# Patient Record
Sex: FEMALE | Race: WHITE | NOT HISPANIC OR LATINO | Employment: FULL TIME | URBAN - METROPOLITAN AREA
[De-identification: names, ages, dates, MRNs, and addresses within clinical notes are randomized per-mention and may not be internally consistent; named-entity substitution may affect disease eponyms.]

---

## 2022-03-28 LAB
EXTERNAL HEPATITIS B SURFACE ANTIGEN: NEGATIVE
EXTERNAL HEPATITIS C AB: NON REACTIVE
EXTERNAL RUBELLA QUALITATIVE: NORMAL
EXTERNAL SYPHILIS RPR SCREEN: NORMAL
HIV1 P24 AG SERPL QL IA: NORMAL

## 2022-05-11 ENCOUNTER — APPOINTMENT (OUTPATIENT)
Dept: ULTRASOUND IMAGING | Facility: HOSPITAL | Age: 37
End: 2022-05-11

## 2022-05-11 ENCOUNTER — HOSPITAL ENCOUNTER (EMERGENCY)
Facility: HOSPITAL | Age: 37
Discharge: HOME | End: 2022-05-11
Attending: EMERGENCY MEDICINE | Admitting: EMERGENCY MEDICINE

## 2022-05-11 VITALS
RESPIRATION RATE: 18 BRPM | HEART RATE: 76 BPM | BODY MASS INDEX: 25.12 KG/M2 | WEIGHT: 156.31 LBS | OXYGEN SATURATION: 99 % | TEMPERATURE: 98 F | HEIGHT: 66 IN | SYSTOLIC BLOOD PRESSURE: 118 MMHG | DIASTOLIC BLOOD PRESSURE: 75 MMHG

## 2022-05-11 DIAGNOSIS — Z3A.14 14 WEEKS GESTATION OF PREGNANCY: Primary | ICD-10-CM

## 2022-05-11 DIAGNOSIS — R10.11 RIGHT UPPER QUADRANT ABDOMINAL PAIN: ICD-10-CM

## 2022-05-11 DIAGNOSIS — S39.011A STRAIN OF ABDOMINAL WALL, INITIAL ENCOUNTER: ICD-10-CM

## 2022-05-11 LAB
ALBUMIN SERPL-MCNC: 3.7 G/DL (ref 3.5–5.2)
ALBUMIN/GLOB SERPL: 1.2 G/DL
ALP SERPL-CCNC: 54 U/L (ref 39–117)
ALT SERPL W P-5'-P-CCNC: 13 U/L (ref 1–33)
ANION GAP SERPL CALCULATED.3IONS-SCNC: 12 MMOL/L (ref 5–15)
AST SERPL-CCNC: 21 U/L (ref 1–32)
BASOPHILS # BLD AUTO: 0.1 10*3/MM3 (ref 0–0.2)
BASOPHILS NFR BLD AUTO: 0.7 % (ref 0–1.5)
BILIRUB SERPL-MCNC: <0.2 MG/DL (ref 0–1.2)
BUN SERPL-MCNC: 9 MG/DL (ref 6–20)
BUN/CREAT SERPL: 15.5 (ref 7–25)
CALCIUM SPEC-SCNC: 9.6 MG/DL (ref 8.6–10.5)
CHLORIDE SERPL-SCNC: 103 MMOL/L (ref 98–107)
CO2 SERPL-SCNC: 21 MMOL/L (ref 22–29)
CREAT SERPL-MCNC: 0.58 MG/DL (ref 0.57–1)
DEPRECATED RDW RBC AUTO: 40.3 FL (ref 37–54)
EGFRCR SERPLBLD CKD-EPI 2021: 121.2 ML/MIN/1.73
EOSINOPHIL # BLD AUTO: 0.1 10*3/MM3 (ref 0–0.4)
EOSINOPHIL NFR BLD AUTO: 1 % (ref 0.3–6.2)
ERYTHROCYTE [DISTWIDTH] IN BLOOD BY AUTOMATED COUNT: 12.8 % (ref 12.3–15.4)
GLOBULIN UR ELPH-MCNC: 3 GM/DL
GLUCOSE SERPL-MCNC: 95 MG/DL (ref 65–99)
HCT VFR BLD AUTO: 40.8 % (ref 34–46.6)
HGB BLD-MCNC: 13.3 G/DL (ref 12–15.9)
LIPASE SERPL-CCNC: 47 U/L (ref 13–60)
LYMPHOCYTES # BLD AUTO: 1.8 10*3/MM3 (ref 0.7–3.1)
LYMPHOCYTES NFR BLD AUTO: 17.9 % (ref 19.6–45.3)
MCH RBC QN AUTO: 29.3 PG (ref 26.6–33)
MCHC RBC AUTO-ENTMCNC: 32.5 G/DL (ref 31.5–35.7)
MCV RBC AUTO: 90.2 FL (ref 79–97)
MONOCYTES # BLD AUTO: 0.5 10*3/MM3 (ref 0.1–0.9)
MONOCYTES NFR BLD AUTO: 4.7 % (ref 5–12)
NEUTROPHILS NFR BLD AUTO: 7.4 10*3/MM3 (ref 1.7–7)
NEUTROPHILS NFR BLD AUTO: 75.7 % (ref 42.7–76)
NRBC BLD AUTO-RTO: 0 /100 WBC (ref 0–0.2)
PLATELET # BLD AUTO: 280 10*3/MM3 (ref 140–450)
PMV BLD AUTO: 7.8 FL (ref 6–12)
POTASSIUM SERPL-SCNC: 3.9 MMOL/L (ref 3.5–5.2)
PROT SERPL-MCNC: 6.7 G/DL (ref 6–8.5)
RBC # BLD AUTO: 4.53 10*6/MM3 (ref 3.77–5.28)
SODIUM SERPL-SCNC: 136 MMOL/L (ref 136–145)
WBC NRBC COR # BLD: 9.8 10*3/MM3 (ref 3.4–10.8)

## 2022-05-11 PROCEDURE — 99283 EMERGENCY DEPT VISIT LOW MDM: CPT

## 2022-05-11 PROCEDURE — 76801 OB US < 14 WKS SINGLE FETUS: CPT

## 2022-05-11 PROCEDURE — 85025 COMPLETE CBC W/AUTO DIFF WBC: CPT | Performed by: NURSE PRACTITIONER

## 2022-05-11 PROCEDURE — 80053 COMPREHEN METABOLIC PANEL: CPT | Performed by: NURSE PRACTITIONER

## 2022-05-11 PROCEDURE — 93976 VASCULAR STUDY: CPT

## 2022-05-11 PROCEDURE — 83690 ASSAY OF LIPASE: CPT | Performed by: NURSE PRACTITIONER

## 2022-05-11 RX ORDER — SODIUM CHLORIDE 0.9 % (FLUSH) 0.9 %
10 SYRINGE (ML) INJECTION AS NEEDED
Status: DISCONTINUED | OUTPATIENT
Start: 2022-05-11 | End: 2022-05-11 | Stop reason: HOSPADM

## 2022-05-11 NOTE — ED PROVIDER NOTES
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   HENT: Negative for congestion, tinnitus and trouble swallowing.    Eyes: Negative for photophobia, discharge and redness.   Respiratory: Negative for cough and shortness of breath.    Cardiovascular: Negative for chest pain and palpitations.   Gastrointestinal: Positive for abdominal pain. Negative for diarrhea, nausea and vomiting.        14 weeks pregnant   Genitourinary: Negative for dysuria, frequency, urgency, vaginal bleeding and vaginal discharge.   Musculoskeletal: Negative for back pain, joint swelling and myalgias.   Skin: Negative for rash.   Neurological: Negative for dizziness and headaches.   Psychiatric/Behavioral: Negative for confusion.   All other systems reviewed and are negative.      History reviewed. No pertinent past medical history.    Allergies   Allergen Reactions   • Amoxicillin Hives   • Eggs Or Egg-Derived Products GI Intolerance   • Lac Bovis GI Intolerance       History reviewed. No pertinent surgical history.    History reviewed. No pertinent family history.    Social History     Socioeconomic History   • Marital status:    Tobacco Use   • Smoking status: Never Smoker           Objective   Physical Exam  Vitals reviewed.   Constitutional:       General: She is not in acute distress.     Appearance: She is well-developed and normal weight. She is not ill-appearing or  "toxic-appearing.   HENT:      Head: Normocephalic and atraumatic.      Mouth/Throat:      Mouth: Mucous membranes are moist.   Eyes:      Conjunctiva/sclera: Conjunctivae normal.      Pupils: Pupils are equal, round, and reactive to light.   Cardiovascular:      Rate and Rhythm: Normal rate and regular rhythm.      Heart sounds: Normal heart sounds.   Pulmonary:      Effort: Pulmonary effort is normal. No respiratory distress.      Breath sounds: Normal breath sounds. No wheezing.   Abdominal:      General: Abdomen is protuberant. Bowel sounds are normal. There is no distension.      Palpations: Abdomen is soft. There is no mass.      Tenderness: There is no abdominal tenderness. There is no guarding or rebound.      Comments: The uterus is palpated at about the umbilicus-no palpable signs of contraction  Fetal heart tones were obtained by the nursing staff.   Musculoskeletal:         General: No deformity. Normal range of motion.      Cervical back: Normal range of motion and neck supple.   Skin:     General: Skin is warm and dry.      Capillary Refill: Capillary refill takes less than 2 seconds.   Neurological:      General: No focal deficit present.      Mental Status: She is alert and oriented to person, place, and time.      GCS: GCS eye subscore is 4. GCS verbal subscore is 5. GCS motor subscore is 6.      Cranial Nerves: No cranial nerve deficit.      Sensory: No sensory deficit.      Deep Tendon Reflexes: Reflexes normal.         Procedures           ED Course  ED Course as of 05/11/22 1528   Wed May 11, 2022   1345 Ultrasound being performed at this time [KW]      ED Course User Index  [KW] Diane Thomas, APRN      /69   Pulse 89   Temp 98.1 °F (36.7 °C) (Oral)   Resp 18   Ht 167.6 cm (66\")   Wt 70.9 kg (156 lb 4.9 oz)   SpO2 100%   BMI 25.23 kg/m²   Labs Reviewed   COMPREHENSIVE METABOLIC PANEL - Abnormal; Notable for the following components:       Result Value    CO2 21.0 (*)     All " other components within normal limits    Narrative:     GFR Normal >60  Chronic Kidney Disease <60  Kidney Failure <15     CBC WITH AUTO DIFFERENTIAL - Abnormal; Notable for the following components:    Lymphocyte % 17.9 (*)     Monocyte % 4.7 (*)     Neutrophils, Absolute 7.40 (*)     All other components within normal limits   LIPASE - Normal   CBC AND DIFFERENTIAL    Narrative:     The following orders were created for panel order CBC & Differential.  Procedure                               Abnormality         Status                     ---------                               -----------         ------                     CBC Auto Differential[[id number]]        Abnormal            Final result                 Please view results for these tests on the individual orders.     Medications   sodium chloride 0.9 % flush 10 mL (has no administration in time range)     US Ob < 14 Weeks Single or First Gestation   Final Result   1.Single intrauterine pregnancy with estimated gestational age based on   ultrasound measurements of 13 weeks, 6 days, which is greater than   provided clinical age (12 weeks, 3 days).   2.Fetal heart rate detected at 152 bpm.   3.1.9 cm simple appearing right ovarian cyst. Nonvisualization of the   left ovary.       Electronically Signed By-Toshia Alicea MD On:5/11/2022 1:36 PM   This report was finalized on [id number] by  Toshia Alicea MD.                                                     MDM  Number of Diagnoses or Management Options  14 weeks gestation of pregnancy  Right upper quadrant abdominal pain  Strain of abdominal wall, initial encounter  Diagnosis management comments: Patient's fetal heart tones were found to be 155 per nursing staff-  CBC chemistry lipase all within normal limits patient had a fetal ultrasound which showed live intrauterine pregnancy of about 14 weeks-with no abnormalities noted on ultrasound.    Patient states she contacted her OB/GYN at [facility] and  they will look at the images she will follow-up with them as scheduled  She is alert oriented nontoxic in no acute distress at discharge       Amount and/or Complexity of Data Reviewed  Clinical lab tests: reviewed  Tests in the radiology section of CPT®: reviewed    Risk of Complications, Morbidity, and/or Mortality  Presenting problems: low  Diagnostic procedures: low  Management options: low    Patient Progress  Patient progress: improved      Final diagnoses:   14 weeks gestation of pregnancy   Right upper quadrant abdominal pain   Strain of abdominal wall, initial encounter       ED Disposition  ED Disposition     ED Disposition   Discharge    Condition   Stable    Comment   --               Follow-up with your OB/GYN as scheduled             Medication List      No changes were made to your prescriptions during this visit.          Diane Thomas, APRN  05/11/22 1528

## 2022-10-02 ENCOUNTER — HOSPITAL ENCOUNTER (EMERGENCY)
Facility: HOSPITAL | Age: 37
Discharge: HOME | End: 2022-10-02
Attending: OBSTETRICS & GYNECOLOGY | Admitting: OBSTETRICS & GYNECOLOGY

## 2022-10-02 VITALS
SYSTOLIC BLOOD PRESSURE: 111 MMHG | HEIGHT: 66 IN | HEART RATE: 60 BPM | RESPIRATION RATE: 18 BRPM | DIASTOLIC BLOOD PRESSURE: 69 MMHG | BODY MASS INDEX: 28.61 KG/M2 | TEMPERATURE: 97.9 F | WEIGHT: 178 LBS

## 2022-10-02 PROCEDURE — 59025 FETAL NON-STRESS TEST: CPT | Performed by: OBSTETRICS & GYNECOLOGY

## 2022-10-02 PROCEDURE — 99283 EMERGENCY DEPT VISIT LOW MDM: CPT | Performed by: OBSTETRICS & GYNECOLOGY

## 2022-10-02 RX ORDER — MULTIVIT WITH MINERALS/LUTEIN
1000 TABLET ORAL DAILY
COMMUNITY

## 2022-10-02 NOTE — H&P
[facility]  Obstetric History and Physical    Chief Complaint   Patient presents with   • Decreased Fetal Movement     JASON-Reports decreased fetal movement since yesterday. This morning, states she went 2 hours without noting any FM. Denies contractions, VB, or LOF       Subjective     HPI:    Patient is a 37 y.o. female  currently at 34w6d, who presents with decrease in fetal movement since yesterday. Notes small movement on way to hospital. Denies VB, LOF or contractions.     Her prenatal care is benign.  Her previous obstetric/gynecological history is noted for is non-contributory.    The following portions of the patients history were reviewed and updated as appropriate:   current medications, allergies, past medical history, past surgical history, past family history, past social history and current problem list.     Prenatal Information:  Prenatal Results     POC Urine Glucose/Protein     Test Value Reference Range Date Time    Urine Glucose        Urine Protein              Initial Prenatal Labs     Test Value Reference Range Date Time    Hemoglobin  13.3 g/dL 12.0 - 15.9 22 1204    Hematocrit  40.8 % 34.0 - 46.6 22 1204    Platelets  280 10*3/mm3 140 - 450 22 1204    Rubella IgG        Hepatitis B SAg        Hepatitis C Ab        RPR        ABO        Rh        Antibody Screen        HIV        Urine Culture        Gonorrhea        Chlamydia        TSH        HgB A1c               2nd and 3rd Trimester     Test Value Reference Range Date Time    Hemoglobin (repeated)        Hematocrit (repeated)        Platelets   280 10*3/mm3 140 - 450 22 1204    GCT        Antibody Screen (repeated)        GTT Fasting        GTT 1 Hr        GTT 2 Hr        GTT 3 Hr        Group B Strep              Drug Screening     Test Value Reference Range Date Time    Amphetamine Screen        Barbiturate Screen        Benzodiazepine Screen        Methadone Screen        Phencyclidine Screen         Opiates Screen        THC Screen        Cocaine Screen        Propoxyphene Screen        Buprenorphine Screen        Methamphetamine Screen        Oxycodone Screen        Tricyclic Antidepressants Screen              Other (Risk screening)     Test Value Reference Range Date Time    Varicella IgG        Parvovirus IgG        CMV IgG        Cystic Fibrosis        Hemoglobin electrophoresis        NIPT        MSAFP-4        AFP (for NTD only)              Legend    ^: Historical                      External Prenatal Results     Pregnancy Outside Results - Transcribed From Office Records - See Scanned Records For Details     Test Value Date Time    ABO       Rh       Antibody Screen       Varicella IgG       Rubella       Hgb  13.3 g/dL 22 1204    Hct  40.8 % 22 1204    Glucose Fasting GTT       Glucose Tolerance Test 1 hour       Glucose Tolerance Test 3 hour       Gonorrhea (discrete)       Chlamydia (discrete)       RPR       VDRL       Syphilis Antibody       HBsAg       Herpes Simplex Virus PCR       Herpes Simplex VIrus Culture       HIV       Hep C RNA Quant PCR       Hep C Antibody       AFP       Group B Strep       GBS Susceptibility to Clindamycin       GBS Susceptibility to Erythromycin       Fetal Fibronectin       Genetic Testing, Maternal Blood             Drug Screening     Test Value Date Time    Urine Drug Screen       Amphetamine Screen       Barbiturate Screen       Benzodiazepine Screen       Methadone Screen       Phencyclidine Screen       Opiates Screen       THC Screen       Cocaine Screen       Propoxyphene Screen       Buprenorphine Screen       Methamphetamine Screen       Oxycodone Screen       Tricyclic Antidepressants Screen             Legend    ^: Historical                         Past OB History:     OB History    Para Term  AB Living   1 0 0 0 0 0   SAB IAB Ectopic Molar Multiple Live Births   0 0 0 0 0 0      # Outcome Date GA Lbr Daniel/2nd Weight Sex  Delivery Anes PTL Lv   1 Current                Past Medical History: Past Medical History:   Diagnosis Date   • Abnormal Pap smear of cervix    • Anxiety    • HPV (human papilloma virus) infection       Past Surgical History No past surgical history on file.   Family History: No family history on file.   Social History:  reports that she has never smoked. She does not have any smokeless tobacco history on file.   has no history on file for alcohol use.   has no history on file for drug use.        General ROS:     Review of Systems   Constitutional: Negative.    Eyes: Negative for visual disturbance.   Respiratory: Negative for shortness of breath.    Cardiovascular: Positive for leg swelling.   Gastrointestinal: Negative for abdominal pain, nausea and vomiting.   Genitourinary: Negative for difficulty urinating, vaginal bleeding and vaginal discharge.   Musculoskeletal: Positive for back pain.   Neurological: Negative for headaches. Weakness: n/a.   Psychiatric/Behavioral: The patient is nervous/anxious.        Objective       Vital Signs Range for the last 24 hours  Temperature: Temp:  [97.9 °F (36.6 °C)] 97.9 °F (36.6 °C)   Temp Source: Temp src: Oral   BP: BP: (111-126)/(69-79) 111/69   Pulse: Heart Rate:  [60-67] 60   Respirations: Resp:  [18] 18   SPO2:       Physical Examination:     General: Well developed and well nourished female in NAD   Abdomen: Not performed.   FHT's: reassuring, reactive and category 1      Cervix: was not checked.   Presentation: n/a   Contractions: none   Lower Extremities: Edema is present bilateral      Presentation: n/a   Cervix:     Dilation:     Effacement:     Station:         Fetal Heart Rate Assessment   Method:ext     Beats/min:135     Baseline:130     Variability:moderate     Accels:+     Decels:neg1     Tracing Category:       Uterine Assessment   Method:ext     Frequency (min):irritability     Ctx Count in 10 min:     Duration:     Intensity:     [place] Units:        GBS is unknown.       Assessment & Plan       * No active hospital problems. *        Assessment:  1.  Intrauterine pregnancy at 34w6d gestation with reactive, reassuring fetal status.    2.  decreased fetal movement  3.  Obstetrical history significant for is non-contributory.  4.  GBS status: No results found for: STREPGPB    Plan:  1. Discharge to home.    2.  Plan of care has been reviewed with patient and patient agrees.   3.  Risks, benefits of treatment plan have been discussed.  4.  All questions have been answered.        Electronically signed by Elizabeth Owens MD, 10/02/22, 11:03 AM EDT.

## 2022-10-12 ENCOUNTER — HOSPITAL ENCOUNTER (OUTPATIENT)
Facility: HOSPITAL | Age: 37
End: 2022-10-12
Attending: OBSTETRICS & GYNECOLOGY | Admitting: OBSTETRICS & GYNECOLOGY

## 2022-10-12 ENCOUNTER — HOSPITAL ENCOUNTER (OUTPATIENT)
Facility: HOSPITAL | Age: 37
Discharge: HOME | End: 2022-10-12
Attending: OBSTETRICS & GYNECOLOGY | Admitting: OBSTETRICS & GYNECOLOGY

## 2022-10-12 VITALS
HEART RATE: 60 BPM | SYSTOLIC BLOOD PRESSURE: 121 MMHG | TEMPERATURE: 98.6 F | DIASTOLIC BLOOD PRESSURE: 77 MMHG | HEIGHT: 67 IN | BODY MASS INDEX: 28.41 KG/M2 | WEIGHT: 181 LBS | RESPIRATION RATE: 17 BRPM

## 2022-10-12 DIAGNOSIS — Z3A.36 36 WEEKS GESTATION OF PREGNANCY: Primary | ICD-10-CM

## 2022-10-12 DIAGNOSIS — Z3A.36 36 WEEKS GESTATION OF PREGNANCY: ICD-10-CM

## 2022-10-12 PROBLEM — Z34.90 PREGNANCY: Status: ACTIVE | Noted: 2022-10-12

## 2022-10-12 LAB
ALBUMIN SERPL-MCNC: 3.2 G/DL (ref 3.5–5.2)
ALBUMIN/GLOB SERPL: 1.2 G/DL
ALP SERPL-CCNC: 99 U/L (ref 39–117)
ALT SERPL W P-5'-P-CCNC: 15 U/L (ref 1–33)
ANION GAP SERPL CALCULATED.3IONS-SCNC: 8.6 MMOL/L (ref 5–15)
AST SERPL-CCNC: 19 U/L (ref 1–32)
BILIRUB SERPL-MCNC: <0.2 MG/DL (ref 0–1.2)
BUN SERPL-MCNC: 9 MG/DL (ref 6–20)
BUN/CREAT SERPL: 14.3 (ref 7–25)
CALCIUM SPEC-SCNC: 9 MG/DL (ref 8.6–10.5)
CHLORIDE SERPL-SCNC: 102 MMOL/L (ref 98–107)
CO2 SERPL-SCNC: 23.4 MMOL/L (ref 22–29)
CREAT SERPL-MCNC: 0.63 MG/DL (ref 0.57–1)
CREAT UR-MCNC: 9.6 MG/DL
DEPRECATED RDW RBC AUTO: 41.2 FL (ref 37–54)
EGFRCR SERPLBLD CKD-EPI 2021: 118.1 ML/MIN/1.73
ERYTHROCYTE [DISTWIDTH] IN BLOOD BY AUTOMATED COUNT: 12.6 % (ref 12.3–15.4)
EXTERNAL GROUP B STREP ANTIGEN: POSITIVE
GLOBULIN UR ELPH-MCNC: 2.7 GM/DL
GLUCOSE SERPL-MCNC: 78 MG/DL (ref 65–99)
HCT VFR BLD AUTO: 39.4 % (ref 34–46.6)
HGB BLD-MCNC: 12.8 G/DL (ref 12–15.9)
MCH RBC QN AUTO: 29 PG (ref 26.6–33)
MCHC RBC AUTO-ENTMCNC: 32.5 G/DL (ref 31.5–35.7)
MCV RBC AUTO: 89.3 FL (ref 79–97)
PLATELET # BLD AUTO: 255 10*3/MM3 (ref 140–450)
PMV BLD AUTO: 10.5 FL (ref 6–12)
POTASSIUM SERPL-SCNC: 3.9 MMOL/L (ref 3.5–5.2)
PROT ?TM UR-MCNC: <4 MG/DL
PROT SERPL-MCNC: 5.9 G/DL (ref 6–8.5)
PROT/CREAT UR: NORMAL MG/G{CREAT}
RBC # BLD AUTO: 4.41 10*6/MM3 (ref 3.77–5.28)
SODIUM SERPL-SCNC: 134 MMOL/L (ref 136–145)
WBC NRBC COR # BLD: 12.1 10*3/MM3 (ref 3.4–10.8)

## 2022-10-12 PROCEDURE — 85027 COMPLETE CBC AUTOMATED: CPT | Performed by: OBSTETRICS & GYNECOLOGY

## 2022-10-12 PROCEDURE — 36415 COLL VENOUS BLD VENIPUNCTURE: CPT | Performed by: OBSTETRICS & GYNECOLOGY

## 2022-10-12 PROCEDURE — 82570 ASSAY OF URINE CREATININE: CPT | Performed by: OBSTETRICS & GYNECOLOGY

## 2022-10-12 PROCEDURE — 59025 FETAL NON-STRESS TEST: CPT

## 2022-10-12 PROCEDURE — 80053 COMPREHEN METABOLIC PANEL: CPT | Performed by: OBSTETRICS & GYNECOLOGY

## 2022-10-12 PROCEDURE — G0463 HOSPITAL OUTPT CLINIC VISIT: HCPCS

## 2022-10-12 PROCEDURE — 84156 ASSAY OF PROTEIN URINE: CPT | Performed by: OBSTETRICS & GYNECOLOGY

## 2022-10-12 RX ORDER — DIPHENOXYLATE HYDROCHLORIDE AND ATROPINE SULFATE 2.5; .025 MG/1; MG/1
TABLET ORAL EVERY 24 HOURS
COMMUNITY

## 2022-10-12 NOTE — NON STRESS TEST
Michelle Elizondo, a  at 36w2d with an JOANA of 2022, Date entered prior to episode creation, was seen at [facility] LABOR DELIVERY for a nonstress test.    Chief Complaint   Patient presents with   • Elevated Blood Pressure     Pt to L/D from office for increased BP and swelling.       Patient Active Problem List   Diagnosis   • Pregnancy       Start Time: 944  Stop Time: 1123    Interpretation A  Nonstress Test Interpretation A: Reactive

## 2022-10-13 LAB
COLLECT DURATION TIME UR: 24 HRS
COLLECT DURATION TIME UR: 24 HRS
CREAT UR-MCNC: 50.2 MG/DL
CREATINE 24H UR-MRATE: 1.2 G/24 HR (ref 0.7–1.6)
PROT 24H UR-MRATE: 163.2 MG/24HOURS (ref 0–150)
SPECIMEN VOL 24H UR: 2400 ML
SPECIMEN VOL 24H UR: 2400 ML

## 2022-10-13 PROCEDURE — 81050 URINALYSIS VOLUME MEASURE: CPT | Performed by: OBSTETRICS & GYNECOLOGY

## 2022-10-13 PROCEDURE — 82570 ASSAY OF URINE CREATININE: CPT | Performed by: OBSTETRICS & GYNECOLOGY

## 2022-10-13 PROCEDURE — 84156 ASSAY OF PROTEIN URINE: CPT | Performed by: OBSTETRICS & GYNECOLOGY

## 2022-10-15 ENCOUNTER — HOSPITAL ENCOUNTER (INPATIENT)
Facility: HOSPITAL | Age: 37
LOS: 2 days | Discharge: HOME | End: 2022-10-17
Attending: OBSTETRICS & GYNECOLOGY | Admitting: OBSTETRICS & GYNECOLOGY

## 2022-10-15 ENCOUNTER — ANESTHESIA (OUTPATIENT)
Dept: LABOR AND DELIVERY | Facility: HOSPITAL | Age: 37
End: 2022-10-15

## 2022-10-15 ENCOUNTER — ANESTHESIA EVENT (OUTPATIENT)
Dept: LABOR AND DELIVERY | Facility: HOSPITAL | Age: 37
End: 2022-10-15

## 2022-10-15 PROBLEM — Z34.90 PREGNANCY: Status: ACTIVE | Noted: 2022-10-15

## 2022-10-15 LAB
A1 MICROGLOB PLACENTAL VAG QL: POSITIVE
ABO GROUP BLD: NORMAL
ALBUMIN SERPL-MCNC: 3.3 G/DL (ref 3.5–5.2)
ALBUMIN/GLOB SERPL: 1.1 G/DL
ALP SERPL-CCNC: 101 U/L (ref 39–117)
ALT SERPL W P-5'-P-CCNC: 24 U/L (ref 1–33)
ANION GAP SERPL CALCULATED.3IONS-SCNC: 10.2 MMOL/L (ref 5–15)
AST SERPL-CCNC: 23 U/L (ref 1–32)
BILIRUB SERPL-MCNC: <0.2 MG/DL (ref 0–1.2)
BLD GP AB SCN SERPL QL: NEGATIVE
BUN SERPL-MCNC: 11 MG/DL (ref 6–20)
BUN/CREAT SERPL: 17.7 (ref 7–25)
CALCIUM SPEC-SCNC: 8.8 MG/DL (ref 8.6–10.5)
CHLORIDE SERPL-SCNC: 108 MMOL/L (ref 98–107)
CO2 SERPL-SCNC: 21.8 MMOL/L (ref 22–29)
CREAT SERPL-MCNC: 0.62 MG/DL (ref 0.57–1)
DEPRECATED RDW RBC AUTO: 39.8 FL (ref 37–54)
EGFRCR SERPLBLD CKD-EPI 2021: 118.5 ML/MIN/1.73
ERYTHROCYTE [DISTWIDTH] IN BLOOD BY AUTOMATED COUNT: 12.7 % (ref 12.3–15.4)
GLOBULIN UR ELPH-MCNC: 2.9 GM/DL
GLUCOSE SERPL-MCNC: 84 MG/DL (ref 65–99)
HCT VFR BLD AUTO: 39.5 % (ref 34–46.6)
HGB BLD-MCNC: 13.6 G/DL (ref 12–15.9)
MCH RBC QN AUTO: 30 PG (ref 26.6–33)
MCHC RBC AUTO-ENTMCNC: 34.4 G/DL (ref 31.5–35.7)
MCV RBC AUTO: 87 FL (ref 79–97)
PLATELET # BLD AUTO: 262 10*3/MM3 (ref 140–450)
PMV BLD AUTO: 10.6 FL (ref 6–12)
POTASSIUM SERPL-SCNC: 4.1 MMOL/L (ref 3.5–5.2)
PROT SERPL-MCNC: 6.2 G/DL (ref 6–8.5)
RBC # BLD AUTO: 4.54 10*6/MM3 (ref 3.77–5.28)
RH BLD: POSITIVE
SODIUM SERPL-SCNC: 140 MMOL/L (ref 136–145)
T&S EXPIRATION DATE: NORMAL
WBC NRBC COR # BLD: 15.65 10*3/MM3 (ref 3.4–10.8)

## 2022-10-15 PROCEDURE — 86850 RBC ANTIBODY SCREEN: CPT | Performed by: OBSTETRICS & GYNECOLOGY

## 2022-10-15 PROCEDURE — 25010000002 CEFAZOLIN IN DEXTROSE 2-4 GM/100ML-% SOLUTION: Performed by: OBSTETRICS & GYNECOLOGY

## 2022-10-15 PROCEDURE — 85027 COMPLETE CBC AUTOMATED: CPT | Performed by: OBSTETRICS & GYNECOLOGY

## 2022-10-15 PROCEDURE — 84112 EVAL AMNIOTIC FLUID PROTEIN: CPT | Performed by: OBSTETRICS & GYNECOLOGY

## 2022-10-15 PROCEDURE — 86900 BLOOD TYPING SEROLOGIC ABO: CPT | Performed by: OBSTETRICS & GYNECOLOGY

## 2022-10-15 PROCEDURE — 25010000002 OXYTOCIN PER 10 UNITS

## 2022-10-15 PROCEDURE — C1755 CATHETER, INTRASPINAL: HCPCS | Performed by: ANESTHESIOLOGY

## 2022-10-15 PROCEDURE — 25010000002 METHYLERGONOVINE MALEATE PER 0.2 MG: Performed by: OBSTETRICS & GYNECOLOGY

## 2022-10-15 PROCEDURE — 86901 BLOOD TYPING SEROLOGIC RH(D): CPT | Performed by: OBSTETRICS & GYNECOLOGY

## 2022-10-15 PROCEDURE — 0HQ9XZZ REPAIR PERINEUM SKIN, EXTERNAL APPROACH: ICD-10-PCS | Performed by: OBSTETRICS & GYNECOLOGY

## 2022-10-15 PROCEDURE — 88307 TISSUE EXAM BY PATHOLOGIST: CPT

## 2022-10-15 PROCEDURE — 99202 OFFICE O/P NEW SF 15 MIN: CPT | Performed by: OBSTETRICS & GYNECOLOGY

## 2022-10-15 PROCEDURE — 80053 COMPREHEN METABOLIC PANEL: CPT | Performed by: OBSTETRICS & GYNECOLOGY

## 2022-10-15 RX ORDER — FENTANYL CIT 0.2 MG/100ML-ROPIV 0.2%-NACL 0.9% EPIDURAL INJ 2/0.2 MCG/ML-%
8 SOLUTION INJECTION CONTINUOUS
Status: DISCONTINUED | OUTPATIENT
Start: 2022-10-15 | End: 2022-10-15

## 2022-10-15 RX ORDER — OXYCODONE HYDROCHLORIDE AND ACETAMINOPHEN 5; 325 MG/1; MG/1
1 TABLET ORAL EVERY 4 HOURS PRN
Status: DISCONTINUED | OUTPATIENT
Start: 2022-10-15 | End: 2022-10-17 | Stop reason: HOSPADM

## 2022-10-15 RX ORDER — IBUPROFEN 800 MG/1
800 TABLET ORAL EVERY 8 HOURS PRN
Status: DISCONTINUED | OUTPATIENT
Start: 2022-10-15 | End: 2022-10-17 | Stop reason: HOSPADM

## 2022-10-15 RX ORDER — CALCIUM CARBONATE 200(500)MG
2 TABLET,CHEWABLE ORAL 3 TIMES DAILY PRN
Status: DISCONTINUED | OUTPATIENT
Start: 2022-10-15 | End: 2022-10-17 | Stop reason: HOSPADM

## 2022-10-15 RX ORDER — OXYCODONE AND ACETAMINOPHEN 10; 325 MG/1; MG/1
1 TABLET ORAL EVERY 4 HOURS PRN
Status: DISCONTINUED | OUTPATIENT
Start: 2022-10-15 | End: 2022-10-17 | Stop reason: HOSPADM

## 2022-10-15 RX ORDER — OXYTOCIN/0.9 % SODIUM CHLORIDE 30/500 ML
999 PLASTIC BAG, INJECTION (ML) INTRAVENOUS ONCE
Status: DISCONTINUED | OUTPATIENT
Start: 2022-10-15 | End: 2022-10-17 | Stop reason: HOSPADM

## 2022-10-15 RX ORDER — MULTIVIT WITH MINERALS/LUTEIN
1000 TABLET ORAL DAILY
COMMUNITY
End: 2022-10-17 | Stop reason: HOSPADM

## 2022-10-15 RX ORDER — SODIUM CHLORIDE 0.9 % (FLUSH) 0.9 %
10 SYRINGE (ML) INJECTION EVERY 12 HOURS SCHEDULED
Status: DISCONTINUED | OUTPATIENT
Start: 2022-10-15 | End: 2022-10-15 | Stop reason: HOSPADM

## 2022-10-15 RX ORDER — ERYTHROMYCIN 5 MG/G
OINTMENT OPHTHALMIC
Status: DISPENSED
Start: 2022-10-15 | End: 2022-10-15

## 2022-10-15 RX ORDER — SODIUM CHLORIDE 0.9 % (FLUSH) 0.9 %
10 SYRINGE (ML) INJECTION AS NEEDED
Status: DISCONTINUED | OUTPATIENT
Start: 2022-10-15 | End: 2022-10-15 | Stop reason: HOSPADM

## 2022-10-15 RX ORDER — PROMETHAZINE HYDROCHLORIDE 12.5 MG/1
12.5 SUPPOSITORY RECTAL EVERY 6 HOURS PRN
Status: DISCONTINUED | OUTPATIENT
Start: 2022-10-15 | End: 2022-10-17 | Stop reason: HOSPADM

## 2022-10-15 RX ORDER — METHYLERGONOVINE MALEATE 0.2 MG/ML
200 INJECTION INTRAVENOUS ONCE
Status: COMPLETED | OUTPATIENT
Start: 2022-10-15 | End: 2022-10-15

## 2022-10-15 RX ORDER — ONDANSETRON 2 MG/ML
4 INJECTION INTRAMUSCULAR; INTRAVENOUS EVERY 6 HOURS PRN
Status: DISCONTINUED | OUTPATIENT
Start: 2022-10-15 | End: 2022-10-17 | Stop reason: HOSPADM

## 2022-10-15 RX ORDER — LIDOCAINE HYDROCHLORIDE AND EPINEPHRINE 15; 5 MG/ML; UG/ML
INJECTION, SOLUTION EPIDURAL AS NEEDED
Status: DISCONTINUED | OUTPATIENT
Start: 2022-10-15 | End: 2022-10-15 | Stop reason: SURG

## 2022-10-15 RX ORDER — PRENATAL VIT NO.126/IRON/FOLIC 28MG-0.8MG
1 TABLET ORAL DAILY
COMMUNITY

## 2022-10-15 RX ORDER — TERBUTALINE SULFATE 1 MG/ML
0.25 INJECTION, SOLUTION SUBCUTANEOUS AS NEEDED
Status: DISCONTINUED | OUTPATIENT
Start: 2022-10-15 | End: 2022-10-15 | Stop reason: HOSPADM

## 2022-10-15 RX ORDER — DOCUSATE SODIUM 100 MG/1
100 CAPSULE, LIQUID FILLED ORAL 2 TIMES DAILY
Status: DISCONTINUED | OUTPATIENT
Start: 2022-10-15 | End: 2022-10-17 | Stop reason: HOSPADM

## 2022-10-15 RX ORDER — PROMETHAZINE HYDROCHLORIDE 25 MG/1
25 TABLET ORAL EVERY 6 HOURS PRN
Status: DISCONTINUED | OUTPATIENT
Start: 2022-10-15 | End: 2022-10-17 | Stop reason: HOSPADM

## 2022-10-15 RX ORDER — MAGNESIUM CARB/ALUMINUM HYDROX 105-160MG
30 TABLET,CHEWABLE ORAL ONCE
Status: DISCONTINUED | OUTPATIENT
Start: 2022-10-15 | End: 2022-10-15 | Stop reason: HOSPADM

## 2022-10-15 RX ORDER — MISOPROSTOL 200 UG/1
800 TABLET ORAL ONCE AS NEEDED
Status: DISCONTINUED | OUTPATIENT
Start: 2022-10-15 | End: 2022-10-17 | Stop reason: HOSPADM

## 2022-10-15 RX ORDER — HYDROCORTISONE 25 MG/G
1 CREAM TOPICAL AS NEEDED
Status: DISCONTINUED | OUTPATIENT
Start: 2022-10-15 | End: 2022-10-17 | Stop reason: HOSPADM

## 2022-10-15 RX ORDER — OXYTOCIN 10 [USP'U]/ML
INJECTION, SOLUTION INTRAMUSCULAR; INTRAVENOUS
Status: COMPLETED
Start: 2022-10-15 | End: 2022-10-15

## 2022-10-15 RX ORDER — SODIUM CHLORIDE, SODIUM LACTATE, POTASSIUM CHLORIDE, CALCIUM CHLORIDE 600; 310; 30; 20 MG/100ML; MG/100ML; MG/100ML; MG/100ML
125 INJECTION, SOLUTION INTRAVENOUS CONTINUOUS
Status: DISCONTINUED | OUTPATIENT
Start: 2022-10-15 | End: 2022-10-15

## 2022-10-15 RX ORDER — PRENATAL VIT/IRON FUM/FOLIC AC 27MG-0.8MG
1 TABLET ORAL DAILY
Status: DISCONTINUED | OUTPATIENT
Start: 2022-10-15 | End: 2022-10-17 | Stop reason: HOSPADM

## 2022-10-15 RX ORDER — LIDOCAINE HYDROCHLORIDE 10 MG/ML
5 INJECTION, SOLUTION EPIDURAL; INFILTRATION; INTRACAUDAL; PERINEURAL AS NEEDED
Status: DISCONTINUED | OUTPATIENT
Start: 2022-10-15 | End: 2022-10-15 | Stop reason: HOSPADM

## 2022-10-15 RX ORDER — CEFAZOLIN SODIUM 1 G/50ML
1 INJECTION, SOLUTION INTRAVENOUS EVERY 8 HOURS
Status: DISCONTINUED | OUTPATIENT
Start: 2022-10-15 | End: 2022-10-15 | Stop reason: HOSPADM

## 2022-10-15 RX ORDER — CARBOPROST TROMETHAMINE 250 UG/ML
250 INJECTION, SOLUTION INTRAMUSCULAR
Status: DISCONTINUED | OUTPATIENT
Start: 2022-10-15 | End: 2022-10-17 | Stop reason: HOSPADM

## 2022-10-15 RX ORDER — MISOPROSTOL 200 UG/1
600 TABLET ORAL ONCE AS NEEDED
Status: COMPLETED | OUTPATIENT
Start: 2022-10-15 | End: 2022-10-15

## 2022-10-15 RX ORDER — PHYTONADIONE 1 MG/.5ML
INJECTION, EMULSION INTRAMUSCULAR; INTRAVENOUS; SUBCUTANEOUS
Status: DISPENSED
Start: 2022-10-15 | End: 2022-10-15

## 2022-10-15 RX ORDER — METHYLERGONOVINE MALEATE 0.2 MG/1
200 TABLET ORAL EVERY 6 HOURS SCHEDULED
Status: DISPENSED | OUTPATIENT
Start: 2022-10-15 | End: 2022-10-16

## 2022-10-15 RX ORDER — EPHEDRINE SULFATE 50 MG/ML
10 INJECTION, SOLUTION INTRAVENOUS
Status: DISCONTINUED | OUTPATIENT
Start: 2022-10-15 | End: 2022-10-15 | Stop reason: HOSPADM

## 2022-10-15 RX ORDER — HYDROXYZINE 50 MG/1
50 TABLET, FILM COATED ORAL NIGHTLY PRN
Status: DISCONTINUED | OUTPATIENT
Start: 2022-10-15 | End: 2022-10-17 | Stop reason: HOSPADM

## 2022-10-15 RX ORDER — CEFAZOLIN SODIUM 2 G/100ML
2 INJECTION, SOLUTION INTRAVENOUS ONCE
Status: COMPLETED | OUTPATIENT
Start: 2022-10-15 | End: 2022-10-15

## 2022-10-15 RX ORDER — METHYLERGONOVINE MALEATE 0.2 MG/ML
200 INJECTION INTRAVENOUS ONCE AS NEEDED
Status: COMPLETED | OUTPATIENT
Start: 2022-10-15 | End: 2022-10-15

## 2022-10-15 RX ORDER — ONDANSETRON 4 MG/1
4 TABLET, FILM COATED ORAL EVERY 8 HOURS PRN
Status: DISCONTINUED | OUTPATIENT
Start: 2022-10-15 | End: 2022-10-17 | Stop reason: HOSPADM

## 2022-10-15 RX ORDER — BISACODYL 10 MG
10 SUPPOSITORY, RECTAL RECTAL DAILY PRN
Status: DISCONTINUED | OUTPATIENT
Start: 2022-10-16 | End: 2022-10-17 | Stop reason: HOSPADM

## 2022-10-15 RX ORDER — OXYTOCIN/0.9 % SODIUM CHLORIDE 30/500 ML
250 PLASTIC BAG, INJECTION (ML) INTRAVENOUS CONTINUOUS
Status: ACTIVE | OUTPATIENT
Start: 2022-10-15 | End: 2022-10-15

## 2022-10-15 RX ADMIN — OXYTOCIN 10 UNITS: 10 INJECTION, SOLUTION INTRAMUSCULAR; INTRAVENOUS at 11:04

## 2022-10-15 RX ADMIN — Medication 1 TABLET: at 18:11

## 2022-10-15 RX ADMIN — LIDOCAINE HYDROCHLORIDE AND EPINEPHRINE 5 ML: 15; 5 INJECTION, SOLUTION EPIDURAL at 10:35

## 2022-10-15 RX ADMIN — METHYLERGONOVINE MALEATE 200 MCG: 0.2 TABLET ORAL at 22:13

## 2022-10-15 RX ADMIN — METHYLERGONOVINE MALEATE 200 MCG: 0.2 INJECTION INTRAVENOUS at 16:14

## 2022-10-15 RX ADMIN — Medication 8 ML/HR: at 10:37

## 2022-10-15 RX ADMIN — METHYLERGONOVINE MALEATE 200 MCG: 0.2 INJECTION INTRAVENOUS at 11:24

## 2022-10-15 RX ADMIN — Medication: at 20:12

## 2022-10-15 RX ADMIN — IBUPROFEN 800 MG: 800 TABLET, FILM COATED ORAL at 19:46

## 2022-10-15 RX ADMIN — SODIUM CHLORIDE, POTASSIUM CHLORIDE, SODIUM LACTATE AND CALCIUM CHLORIDE 1000 ML: 600; 310; 30; 20 INJECTION, SOLUTION INTRAVENOUS at 10:04

## 2022-10-15 RX ADMIN — DOCUSATE SODIUM 100 MG: 100 CAPSULE, LIQUID FILLED ORAL at 19:46

## 2022-10-15 RX ADMIN — CEFAZOLIN SODIUM 2 G: 2 INJECTION, SOLUTION INTRAVENOUS at 10:15

## 2022-10-15 RX ADMIN — MISOPROSTOL 600 MCG: 200 TABLET ORAL at 15:35

## 2022-10-15 NOTE — L&D DELIVERY NOTE
[facility]  Vaginal Delivery Note    Patient Name: Michelle Elizondo  :  1985  MRN:  [id number]    DX:     premature rupture of membranes with onset of labor within 24 hours of rupture in third trimester    Pregnancy    AMA (advanced maternal age) primigravida 35+      Labor status: Active spontaneous labor     Delivery     Delivery: Vaginal, Spontaneous     YOB: 2021    Time of Birth: 10:59 AM      Anesthesia: Epidural     Delivering clinician: Samm Escobar MD       Infant    Findings: Viable female  infant    Infant observations: Weight: 2420 g (5 lb 5.4 oz)        Apgars: 8  @ 1 minute /    9  @ 5 minutes     Placenta, Cord, and Fluid    Placenta delivered  Spontaneous   at  10/15/2022 11:04 AM     Cord: 3 vessels  present.   Cord blood obtained: Yes          Repair    Episiotomy: No   Lacerations: 1st degree     Quantitative Blood Loss:    Quantitative Blood Loss (mL): 260 mL         Delivery narrative: Michelle Elizondo is a 37 y.o.  at 36w5d.  Presented  After having spontaneous rupture of membranes  at 6:07 AM  on 10/15/2022 . Initially not issa but quickly started to get uncomfortable and rapidly progressed to C/C/+1 @ 10/15/2022  10:45 AM .  Epidural had just been placed and was not yet functioning well. Fetal status reassuring throughout.  of viable  female  @ 10:59 AM  over an intact perineum. Anterior shoulder delivered easily.  Delayed cord clamping performed and infant placed in kangaroo care.   2420 g (5 lb 5.4 oz) .     Apgar :  8  @ 1 minute / 9  @ 5 minutes.  Placenta delivered spontaneously, intact with 3 vessel cord. Cervix and rectum intact. 1st degree laceration repaired in usual fashion with 4-0 Chromic suture. Mother and baby recovering good condition.       Samm Escobar MD  10/15/22  11:21 EDT    Note done at time of delivery then refreshed at a later date to include RN documentation not yet completed.

## 2022-10-15 NOTE — NURSING NOTE
[facility] lab called and GBS reported as positive, resistance result not completed, lab asked to fax results to LD, reported to primary RN, Skye YOU

## 2022-10-15 NOTE — H&P
[facility]  Obstetric History and Physical    Chief Complaint   Patient presents with   • Rupture of Membranes     JASON: pt came in with complaints of water breaking 0606 this morning, large gush when she went to the restroom this morning. +FM       Subjective     Patient is a 37 y.o. female  currently at 36w5d, who presents with c/o SROM @0600 this am. Pt states having irregular contractions since yesterday but they have become regular and stronger since her water broke. Prenatal course unremarkable.    Her prenatal care is benign.  Her previous obstetric/gynecological history is noted for is non-contributory.    The following portions of the patients history were reviewed and updated as appropriate: current medications, allergies, past medical history, past surgical history, past family history, past social history and problem list .       Prenatal Information:  Prenatal Results     POC Urine Glucose/Protein     Test Value Reference Range Date Time    Urine Glucose        Urine Protein              Initial Prenatal Labs     Test Value Reference Range Date Time    Hemoglobin        Hematocrit        Platelets        Rubella IgG        Hepatitis B SAg        Hepatitis C Ab        RPR        ABO        Rh        Antibody Screen        HIV        Urine Culture        Gonorrhea        Chlamydia        TSH        HgB A1c               2nd and 3rd Trimester     Test Value Reference Range Date Time    Hemoglobin (repeated)        Hematocrit (repeated)        Platelets         GCT        Antibody Screen (repeated)        GTT Fasting        GTT 1 Hr        GTT 2 Hr        GTT 3 Hr        Group B Strep              Drug Screening     Test Value Reference Range Date Time    Amphetamine Screen        Barbiturate Screen        Benzodiazepine Screen        Methadone Screen        Phencyclidine Screen        Opiates Screen        THC Screen        Cocaine Screen        Propoxyphene Screen        Buprenorphine Screen         Methamphetamine Screen        Oxycodone Screen        Tricyclic Antidepressants Screen              Other (Risk screening)     Test Value Reference Range Date Time    Varicella IgG        Parvovirus IgG        CMV IgG        Cystic Fibrosis        Hemoglobin electrophoresis        NIPT        MSAFP-4        AFP (for NTD only)              Legend    ^: Historical                           Past OB History:     OB History    Para Term  AB Living   1 0 0 0 0 0   SAB IAB Ectopic Molar Multiple Live Births   0 0 0 0 0 0      # Outcome Date GA Lbr Daniel/2nd Weight Sex Delivery Anes PTL Lv   1 Current                Past Medical History: Past Medical History:   Diagnosis Date   • Abnormal Pap smear of cervix    • Anxiety    • HPV (human papilloma virus) infection       Past Surgical History Past Surgical History:   Procedure Laterality Date   • EYE SURGERY     • WISDOM TOOTH EXTRACTION        Family History: No family history on file.   Social History:  reports that she has never smoked. She has never used smokeless tobacco.   reports no history of alcohol use.   reports no history of drug use.        Review of Systems   Constitutional: Negative.    HENT: Negative.    Respiratory: Negative.    Cardiovascular: Negative.    Gastrointestinal: Negative.    Skin: Negative.    Neurological: Negative.    Psychiatric/Behavioral: Negative.          Objective     Vital Signs Range for the last 24 hours  Temperature: Temp:  [97.7 °F (36.5 °C)] 97.7 °F (36.5 °C)   Temp Source: Temp src: Oral   BP: BP: (108-140)/(62-91) 108/62   Pulse: Heart Rate:  [57-71] 57   Respirations: Resp:  [18] 18   SPO2:     O2 Amount (l/min):     O2 Devices     Weight: Weight:  [83.1 kg (183 lb 4.8 oz)] 83.1 kg (183 lb 4.8 oz)     Physical Examination: General appearance - alert, well appearing, and in no distress  Mental status - alert, oriented to person, place, and time  Chest - clear to auscultation, no wheezes, rales or rhonchi, symmetric air  entry  Heart - normal rate, regular rhythm, normal S1, S2, no murmurs, rubs, clicks or gallops  Abdomen - soft, nontender, nondistended, no masses or organomegaly  Pelvic - per RN 3/80/-2  Neurological - alert, oriented, normal speech, no focal findings or movement disorder noted  Musculoskeletal - no joint tenderness, deformity or swelling  Extremities - no pedal edema noted  Skin - normal coloration and turgor, no rashes, no suspicious skin lesions noted    Presentation: vtx   Cervix: Exam by:     Dilation: Cervical Dilation (cm): 3   Effacement: Cervical Effacement: 80%   Station:       Fetal Heart Rate Assessment   Method:     Beats/min:     Baseline:     Variability:     Accels:     Decels:     Tracing Category:       Uterine Assessment   Method:     Frequency (min):     Ctx Count in 10 min:     Duration:     Intensity:     Intensity by IUPC:     Resting Tone:     Resting Tone by IUPC:     Freeborn Units:       Laboratory Results: Rapid Assay for Rupture Membranes Positive  Radiology Review:  Other Studies:     Assessment & Plan       Pregnancy      Assessment:  Early latent labor.   Membrane status: SROM.   Fetal well-being: normal.           Assessment:  1.  Intrauterine pregnancy at 36w5d gestation with reassuring fetal status.    2.  labor  with ROM  3.  Obstetrical history significant for is non-contributory.  4.  GBS status: No results found for: Positive    Plan:  1. Vaginal anticipated  2. Plan of care has been reviewed with patient and Dr Escobar  3. Pt does not desire epidural at present.        Rachell Quiñones DO  10/15/2022  09:54 EDT

## 2022-10-15 NOTE — ANESTHESIA PROCEDURE NOTES
Labor Epidural      Patient reassessed immediately prior to procedure    Patient location during procedure: OB  Start Time: 10/15/2022 10:23 AM  Stop Time: 10/15/2022 10:47 AM  Performed By  Anesthesiologist: Blayne Wellington MD  Preanesthetic Checklist  Completed: patient identified, IV checked, site marked, risks and benefits discussed, surgical consent, monitors and equipment checked, pre-op evaluation and timeout performed  Prep:  Pt Position:sitting  Sterile Tech:cap, gloves, mask and sterile barrier  Prep:povidone-iodine 7.5% surgical scrub  Monitoring:blood pressure monitoring, continuous pulse oximetry and EKG  Epidural Block Procedure:  Approach:midline  Guidance:landmark technique and palpation technique  Location:L4-L5  Needle Type:Tuohy  Needle Gauge:17  Loss of Resistance Medium: air  Paresthesia: left and transient (with catheter thread)  Aspiration:negative  Test Dose:negative  Post Assessment:  Dressing:occlusive dressing applied and secured with tape  Pt Tolerance:patient tolerated the procedure well with no apparent complications  Complications:no

## 2022-10-15 NOTE — LACTATION NOTE
P1. Sweet Baby Yola made a sudden appearance today at 36w5d. She nursed well x 25 mins in L&D per patient. Baby was cold upon admission to  and was placed under warmer in the nursery. Baby was in tshirts top and bottom and was not unwrapped when brought to breast .  attempted to awaken baby without success. Breast massage and hand expression used to encourage baby to latch but no interest was shown and not one hunger cue. Patient needing some management of extra bleeding so infant was placed in crib and the HGP was initiated. Two small drops of colostrum were on the pump flanges and placed on baby's lips. Patient encouraged to use breastpump q 3 hours around the clock until infant nurses well consistently.   Lactation Consult Note    Evaluation Completed: 10/15/2022 16:51 EDT  Patient Name: Michelle Elizondo  :  1985  MRN:  [id number]     REFERRAL  INFORMATION:                          Date of Referral: 10/15/22   Person Making Referral: nurse  Maternal Reason for Referral: breastfeeding currently, no prior breastfeeding experience, maternal age  Infant Reason for Referral: 35-37 weeks gestation, sleepy, low birth weight    DELIVERY HISTORY:        Skin to skin initiation date/time: 10/15/2022  11:20 AM   Skin to skin end date/time:           MATERNAL ASSESSMENT:     Breast Shape: pendulous, round, Bilateral: (10/15/22 1600)  Breast Density: soft (10/15/22 1600)     Nipples: everted, graspable (10/15/22 1600)     Left Nipple Symptoms: intact (10/15/22 1600)  Right Nipple Symptoms: intact (10/15/22 1600)       INFANT ASSESSMENT:  Information for the patient's :  Kassie Elizondo [[id number]]   No past medical history on file.                                                                                                     MATERNAL INFANT FEEDING:     Maternal Emotional State: receptive (10/15/22 1600)         Pain with Feeding: no (10/15/22 1600)                       Latch Assistance: full  assistance needed (10/15/22 1600)                               EQUIPMENT TYPE:  Breast Pump Type: double electric, hospital grade, double electric, personal (10/15/22 1600)  Breast Pump Flange Type: hard (10/15/22 1600)  Breast Pump Flange Size: 24 mm (10/15/22 1600)                        BREAST PUMPING:  Breast Pumping Interventions: early pumping promoted, frequent pumping encouraged (10/15/22 1600)  Breast Pumping: bilateral breasts pumped until soft, double electric breast pump utilized, pre-pumping breast massage, pre-pumping hand expression (10/15/22 1600)    LACTATION REFERRALS:  Lactation Referrals: outpatient lactation program, support group (10/15/22 1600)

## 2022-10-15 NOTE — LACTATION NOTE
LC assisted with pumping. Patient got two little drops again but baby latched and nursed x 20 mins earlier and then had 10cc formula . Mom is having reactions from the methergine , shaking and feeling very cold. Extra blankets brought . If too tired she will not pump during night as long as baby nurses from at least one breast q 2-3 hours. Reviewed  hunger cues and nipple care.

## 2022-10-15 NOTE — ANESTHESIA POSTPROCEDURE EVALUATION
"Patient: Michelle Elizondo    Procedure Summary     Date: 10/15/22 Room / Location:     Anesthesia Start: 1023 Anesthesia Stop: 1059    Procedure: LABOR ANALGESIA Diagnosis:     Scheduled Providers:  Provider: Blayne Wellington MD    Anesthesia Type: epidural ASA Status: 2          Anesthesia Type: epidural    Vitals  Vitals Value Taken Time   /60 10/15/22 1111   Temp     Pulse 71 10/15/22 1111   Resp     SpO2     Vitals shown include unvalidated device data.        Post Anesthesia Care and Evaluation    Patient location during evaluation: bedside  Patient participation: complete - patient participated  Level of consciousness: sleepy but conscious  Pain score: 0  Pain management: adequate    Airway patency: patent  Anesthetic complications: No anesthetic complications    Cardiovascular status: acceptable  Respiratory status: acceptable  Hydration status: acceptable    Comments: /62   Pulse 57   Temp 36.5 °C (97.7 °F) (Oral)   Resp 18   Ht 167.6 cm (66\")   Wt 83.1 kg (183 lb 4.8 oz)   Breastfeeding Yes   BMI 29.59 kg/m²         "

## 2022-10-15 NOTE — ANESTHESIA PREPROCEDURE EVALUATION
Anesthesia Evaluation     Patient summary reviewed and Nursing notes reviewed   NPO Solid Status: > 8 hours  NPO Liquid Status: > 4 hours           Airway   Mallampati: II  Neck ROM: full  No difficulty expected  Dental - normal exam     Pulmonary     breath sounds clear to auscultation  Cardiovascular     Rhythm: regular        Neuro/Psych  (+) psychiatric history,    GI/Hepatic/Renal/Endo      Musculoskeletal     Abdominal    Substance History      OB/GYN    (+) Pregnant,         Other                        Anesthesia Plan    ASA 2     epidural     (  36w5d  )    Anesthetic plan, risks, benefits, and alternatives have been provided, discussed and informed consent has been obtained with: patient.        CODE STATUS:

## 2022-10-16 PROBLEM — O09.519 AMA (ADVANCED MATERNAL AGE) PRIMIGRAVIDA 35+: Status: ACTIVE | Noted: 2022-10-16

## 2022-10-16 LAB
BASOPHILS # BLD AUTO: 0.05 10*3/MM3 (ref 0–0.2)
BASOPHILS NFR BLD AUTO: 0.3 % (ref 0–1.5)
DEPRECATED RDW RBC AUTO: 39.5 FL (ref 37–54)
EOSINOPHIL # BLD AUTO: 0.08 10*3/MM3 (ref 0–0.4)
EOSINOPHIL NFR BLD AUTO: 0.5 % (ref 0.3–6.2)
ERYTHROCYTE [DISTWIDTH] IN BLOOD BY AUTOMATED COUNT: 12.4 % (ref 12.3–15.4)
HCT VFR BLD AUTO: 39.3 % (ref 34–46.6)
HGB BLD-MCNC: 13.4 G/DL (ref 12–15.9)
IMM GRANULOCYTES # BLD AUTO: 0.14 10*3/MM3 (ref 0–0.05)
IMM GRANULOCYTES NFR BLD AUTO: 0.9 % (ref 0–0.5)
LYMPHOCYTES # BLD AUTO: 1.9 10*3/MM3 (ref 0.7–3.1)
LYMPHOCYTES NFR BLD AUTO: 11.6 % (ref 19.6–45.3)
MCH RBC QN AUTO: 29.9 PG (ref 26.6–33)
MCHC RBC AUTO-ENTMCNC: 34.1 G/DL (ref 31.5–35.7)
MCV RBC AUTO: 87.7 FL (ref 79–97)
MONOCYTES # BLD AUTO: 1.45 10*3/MM3 (ref 0.1–0.9)
MONOCYTES NFR BLD AUTO: 8.9 % (ref 5–12)
NEUTROPHILS NFR BLD AUTO: 12.71 10*3/MM3 (ref 1.7–7)
NEUTROPHILS NFR BLD AUTO: 77.8 % (ref 42.7–76)
NRBC BLD AUTO-RTO: 0 /100 WBC (ref 0–0.2)
PLATELET # BLD AUTO: 203 10*3/MM3 (ref 140–450)
PMV BLD AUTO: 11 FL (ref 6–12)
RBC # BLD AUTO: 4.48 10*6/MM3 (ref 3.77–5.28)
WBC NRBC COR # BLD: 16.33 10*3/MM3 (ref 3.4–10.8)

## 2022-10-16 PROCEDURE — 85025 COMPLETE CBC W/AUTO DIFF WBC: CPT | Performed by: OBSTETRICS & GYNECOLOGY

## 2022-10-16 RX ADMIN — IBUPROFEN 800 MG: 800 TABLET, FILM COATED ORAL at 12:37

## 2022-10-16 RX ADMIN — DOCUSATE SODIUM 100 MG: 100 CAPSULE, LIQUID FILLED ORAL at 20:08

## 2022-10-16 RX ADMIN — METHYLERGONOVINE MALEATE 200 MCG: 0.2 TABLET ORAL at 06:24

## 2022-10-16 RX ADMIN — IBUPROFEN 800 MG: 800 TABLET, FILM COATED ORAL at 04:01

## 2022-10-16 RX ADMIN — Medication 1 TABLET: at 09:51

## 2022-10-16 RX ADMIN — IBUPROFEN 800 MG: 800 TABLET, FILM COATED ORAL at 20:08

## 2022-10-16 RX ADMIN — DOCUSATE SODIUM 100 MG: 100 CAPSULE, LIQUID FILLED ORAL at 09:51

## 2022-10-16 NOTE — LACTATION NOTE
Michelle called for help with latch on the left breast. Baby Yola has been favoring the right breast since birth bit that nipple is getting tender. Baby was undressed and awakened easily . She was placed in cradle hold to the left breast and latched instantly. Mom felt some pinching after several minutes so the latch was broken with a clean finger and nipple was found to be elongated and rounded. Baby relatched. Mom is redheaded and she knows her tissue is likely to be more sensiitve and she is taking care with lanolin and expressed milk. Breast compression demonstrated and FOB knows how to be helpful. LC numbers on WB.  Lactation Consult Note    Evaluation Completed: 10/16/2022 17:58 EDT  Patient Name: Michelle Elizondo  :  1985  MRN:  [id number]     REFERRAL  INFORMATION:                          Date of Referral: 10/16/22   Person Making Referral: patient  Maternal Reason for Referral: breastfeeding currently, no prior breastfeeding experience  Infant Reason for Referral: 35-37 weeks gestation    DELIVERY HISTORY:        Skin to skin initiation date/time: 10/15/2022  11:20 AM   Skin to skin end date/time:           MATERNAL ASSESSMENT:     Breast Shape: round (10/16/22 1000)  Breast Density: filling (10/16/22 1700)     Nipples: everted (10/16/22 170)     Left Nipple Symptoms: redness, tender (10/16/22 1000)  Right Nipple Symptoms: redness, tender (10/16/22 1000)       INFANT ASSESSMENT:  Information for the patient's :  Kassie Elizondo [[id number]]   No past medical history on file.                                                                                                     MATERNAL INFANT FEEDING:     Maternal Emotional State: receptive (10/16/22 1000)  Infant Positioning: cross-cradle (10/16/22 1700)   Signs of Milk Transfer: deep jaw excursions noted, suck/swallow ratio, other (see comments) (10/16/22 1700)  Pain with Feeding: yes (10/16/22 1700)  Pain Location: breast, left, nipple,  left (10/16/22 1700)  Pain Description: other (see comments) (pinching.) (10/16/22 1700)  Comfort Measures Before/During Feeding: latch adjusted, other (see comments) (nipple shape checked.) (10/16/22 1700)  Milk Ejection Reflex: present (10/16/22 1700)  Comfort Measures Following Feeding: air-drying encouraged, breast cream/oil applied, expressed milk applied, soap use discouraged (10/16/22 1700)        Latch Assistance: minimal assistance (10/16/22 1700)                               EQUIPMENT TYPE:  Breast Pump Type: double electric, hospital grade, double electric, personal (insurance pumping.) (10/16/22 1700)  Breast Pump Flange Type: hard (10/15/22 1600)  Breast Pump Flange Size: 24 mm (10/15/22 1600)                        BREAST PUMPING:  Breast Pumping Interventions: post-feed pumping encouraged (10/16/22 1000)  Breast Pumping: bilateral breasts pumped until soft, double electric breast pump utilized, pre-pumping breast massage, pre-pumping hand expression (10/16/22 1000)    LACTATION REFERRALS:  Lactation Referrals: outpatient lactation program, support group (10/15/22 1600)

## 2022-10-16 NOTE — PROGRESS NOTES
[facility]  Vaginal Delivery Progress Note    Patient Name: Michelle Elizondo  :  1985  MRN:  [id number]      Subjective   Postpartum Day 1: Vaginal Delivery of a female infant.     The patient feels well without complaints. Her pain is well controlled with PO pain medication. She is tolerating PO, ambulating, and voiding without issue. Reports normal lochia. +Flatus. Mood stable- denies SI, HI.     The patient plans to breastfeed.    Objective     Vital Signs Range for the last 24 hours  Temperature: Temp:  [97.3 °F (36.3 °C)-98.5 °F (36.9 °C)] 97.9 °F (36.6 °C)       BP: BP: (110-138)/(71-76) 110/73   Pulse: Heart Rate:  [56-65] 61   Respirations: Resp:  [16] 16                       Physical Exam:  General: Awake and alert  Abdomen: Fundus: firm, non tender,  below umbilicus  Extremities:  trace edema, NT     Labs:     Results from last 7 days   Lab Units 10/16/22  0522 10/15/22  1005   WBC 10*3/mm3 16.33* 15.65*   HEMOGLOBIN g/dL 13.4 13.6   HEMATOCRIT % 39.3 39.5   PLATELETS 10*3/mm3 203 262       Prenatal labs results reviewed:  Yes   Rubella:  immune  Rh Status:  immune  RH type   Date Value Ref Range Status   10/15/2022 Positive  Final         Assessment & Plan  : 1. PPD1 S/P  - Doing well, continue usual cares.     2. Elevated BP without diagnosis of HTN: single mild range in office. Nml HELLP labs 10/12/22. Normotensive while inpatient.          premature rupture of membranes with onset of labor within 24 hours of rupture in third trimester    Pregnancy    AMA (advanced maternal age) primigravida 35+          Staci Balderas MD  10/16/2022  14:44 EDT

## 2022-10-16 NOTE — LACTATION NOTE
Patient wanted instructions on her Spectra 1 PBP. She is using the HGP at bedside for insurance pumping 4 x day. Baby Yola is actually nursing well. FOb helpful and very supportive. Will call when ready to have LC observe infant latching.

## 2022-10-17 VITALS
RESPIRATION RATE: 16 BRPM | BODY MASS INDEX: 29.46 KG/M2 | HEART RATE: 64 BPM | TEMPERATURE: 98.2 F | DIASTOLIC BLOOD PRESSURE: 76 MMHG | SYSTOLIC BLOOD PRESSURE: 119 MMHG | HEIGHT: 66 IN | WEIGHT: 183.3 LBS

## 2022-10-17 RX ORDER — IBUPROFEN 800 MG/1
800 TABLET ORAL EVERY 8 HOURS PRN
Qty: 30 TABLET | Refills: 0 | Status: SHIPPED | OUTPATIENT
Start: 2022-10-17

## 2022-10-17 RX ORDER — OXYTOCIN 10 [USP'U]/ML
10 INJECTION, SOLUTION INTRAMUSCULAR; INTRAVENOUS ONCE
Status: DISCONTINUED | OUTPATIENT
Start: 2022-10-17 | End: 2022-10-17 | Stop reason: HOSPADM

## 2022-10-17 RX ADMIN — DOCUSATE SODIUM 100 MG: 100 CAPSULE, LIQUID FILLED ORAL at 07:53

## 2022-10-17 RX ADMIN — IBUPROFEN 800 MG: 800 TABLET, FILM COATED ORAL at 07:54

## 2022-10-17 RX ADMIN — Medication 1 TABLET: at 07:53

## 2022-10-17 NOTE — PLAN OF CARE
Goal Outcome Evaluation:               Vitals within acceptable ranges, bleeding at a minimum and without blood clots, pain within acceptable ranges with medication

## 2022-10-17 NOTE — PLAN OF CARE
Problem: Adult Inpatient Plan of Care  Goal: Plan of Care Review  Outcome: Met  Flowsheets (Taken 10/17/2022 1347)  Progress: improving  Plan of Care Reviewed With:   patient   significant other  Outcome Evaluation: pt vss, pain well controlled with motrin. discharge home today, followup in 7-8 weeks.  Goal: Patient-Specific Goal (Individualized)  Outcome: Met  Goal: Absence of Hospital-Acquired Illness or Injury  Outcome: Met  Intervention: Identify and Manage Fall Risk  Recent Flowsheet Documentation  Taken 10/17/2022 1200 by Deepti Ng RN  Safety Promotion/Fall Prevention: safety round/check completed  Taken 10/17/2022 1020 by Deepti Ng RN  Safety Promotion/Fall Prevention: safety round/check completed  Taken 10/17/2022 0754 by Deepti Ng RN  Safety Promotion/Fall Prevention: safety round/check completed  Intervention: Prevent and Manage VTE (Venous Thromboembolism) Risk  Recent Flowsheet Documentation  Taken 10/17/2022 0754 by Deepti Ng RN  Activity Management:   activity adjusted per tolerance   activity encouraged   up ad joão  Goal: Optimal Comfort and Wellbeing  Outcome: Met  Intervention: Provide Person-Centered Care  Recent Flowsheet Documentation  Taken 10/17/2022 0754 by Deepti Ng RN  Trust Relationship/Rapport:   choices provided   empathic listening provided   questions answered   questions encouraged   reassurance provided   thoughts/feelings acknowledged   emotional support provided   care explained  Goal: Readiness for Transition of Care  Outcome: Met   Goal Outcome Evaluation:  Plan of Care Reviewed With: patient, significant other        Progress: improving  Outcome Evaluation: pt vss, pain well controlled with motrin. discharge home today, followup in 7-8 weeks.

## 2022-10-17 NOTE — DISCHARGE SUMMARY
Date of Discharge:  10/17/2022    Discharge Diagnosis: vaginal delivery    Presenting Problem/History of Present Illness  Pregnancy [Z34.90]  Spontaneous vaginal delivery [O80]       Hospital Course  Patient is a 37 y.o. female presented with SROM at term.  Delivered viable femlae infant per Dr. Escobar.  No pp complications.  Ready for d/c home.  Is planning to go to [place] to stay for a while (FOB lives there).  Won't be back until closer to holidays but will return for pp visit-- needs colpo at that time.      Procedures Performed         Consults:   Consults     No orders found from 2022 to 10/16/2022.          Condition on Discharge:   Subjective   Postpartum Day 2 Vaginal Delivery.    The patient feels well without complaints.    Vital Signs  Temp:  [97.9 °F (36.6 °C)-98.5 °F (36.9 °C)] 98.2 °F (36.8 °C)  Heart Rate:  [64-73] 64  Resp:  [16] 16  BP: (119-127)/(76-83) 119/76    Physical Exam:   General: Awake and alert   Abdomen: Fundus: firm, non tender    Extremities:  Calves NT bilaterally    Assessment & Plan     PPD2  S/P  -   Stable for discharge. Instructions reviewed      Discharge Disposition      Discharge Medications     Discharge Medications      New Medications      Instructions Start Date   all purpose nipple ointment   1 application, Topical, Every 2 Hours PRN      ibuprofen 800 MG tablet  Commonly known as: ADVIL,MOTRIN   800 mg, Oral, Every 8 Hours PRN         Continue These Medications      Instructions Start Date   prenatal (CLASSIC) vitamin  tablet  Generic drug: prenatal vitamin   1 tablet, Oral, Daily         Stop These Medications    vitamin C 250 MG tablet  Commonly known as: ASCORBIC ACID              The patient has been prescribed a controlled substance.  She has been counseled on the risks associated with using the medication.   The addictive potential of this medication and alternatives were discussed carefully with this patient and she demonstrated understanding.  MIMI KRUSE  report has been obtained and reviewed.       Activity at Discharge: restrictions reviewed    Follow-up Appointments  No future appointments.      Test Results Pending at Discharge       ALEX Reyez  10/17/22  09:12 EDT

## 2022-10-17 NOTE — LACTATION NOTE
Mom latching baby to left breast. Baby is latching well at this time. Showed mom chin tug to lower bottom lip and she reports latch feels better. baby has audible swallows. Mom has 3.3 cc's of colostrum pumped. LC gave baby all colostrum per syringe. Baby cont to BF well. Mom reports baby is getting some formula supplement. Educated on baby's expected output and weight gain. Mom has OPLC for f/u      Lactation Consult Note    Evaluation Completed: 10/17/2022 08:45 EDT  Patient Name: Michelle Elizondo  :  1985  MRN:  [id number]     REFERRAL  INFORMATION:                          Date of Referral: 10/17/22   Person Making Referral: patient  Maternal Reason for Referral: breastfeeding currently, no prior breastfeeding experience  Infant Reason for Referral: 35-37 weeks gestation    DELIVERY HISTORY:        Skin to skin initiation date/time: 10/15/2022  11:20 AM   Skin to skin end date/time:           MATERNAL ASSESSMENT:     Breast Shape: round (10/17/22 0825)  Breast Density: soft, filling (10/17/22 0825)  Areola: elastic (10/17/22 0825)  Nipples: everted (10/17/22 0825)                INFANT ASSESSMENT:  Information for the patient's :  Kassie Elizondo [[id number]]   No past medical history on file.                                                                                                     MATERNAL INFANT FEEDING:     Maternal Emotional State: relaxed (10/17/22 0825)  Infant Positioning: cross-cradle (10/17/22 0825)      Pain with Feeding: no (10/17/22 0825)           Milk Ejection Reflex: present (10/17/22 0825)           Latch Assistance: verbal guidance offered (10/17/22 0825)                               EQUIPMENT TYPE:  Breast Pump Type: double electric, hospital grade (10/17/22 0825)  Breast Pump Flange Type: hard (10/17/22 0825)  Breast Pump Flange Size: 24 mm (10/17/22 0825)                        BREAST PUMPING:  Breast Pumping Interventions: post-feed pumping encouraged (3-4 times  a day for supplement feeds) (10/17/22 5553)       LACTATION REFERRALS:

## 2022-10-25 RX ORDER — PHYTONADIONE 1 MG/.5ML
INJECTION, EMULSION INTRAMUSCULAR; INTRAVENOUS; SUBCUTANEOUS
Status: DISPENSED
Start: 2022-10-25 | End: 2022-10-26

## 2022-10-25 RX ORDER — ERYTHROMYCIN 5 MG/G
OINTMENT OPHTHALMIC
Status: DISPENSED
Start: 2022-10-25 | End: 2022-10-26

## 2022-11-22 ENCOUNTER — HOSPITAL ENCOUNTER (OUTPATIENT)
Dept: LACTATION | Facility: HOSPITAL | Age: 37
Discharge: HOME | End: 2022-11-22

## 2022-11-22 NOTE — LACTATION NOTE
"Lactation Consult Note  Mom is her today for latch assessment. Reports baby has been choking frequently with BF. Infant is 6 weeks old and was born at 36 weeks 5 days and weighed 5lb5oz. Last was seen in a pediatrician office 4 weeks ago and weighed 5lb 7 oz. Current weigh t is 7lb9.8oz. Infant is gaining really well( about 1.2 oz per day). Upon changing baby's diaper very distended abdomen noticed. Mom said it has been like that for 2 weeks, also mentioned that baby only have 1 to 2 BM a day and it has been very gassy. She has a appointment tomorrow with her Pediatrician for infant's monthly check up. Due to the distended abdomen \"[facility]\" called per LC to try to make an appointment for baby today. Talked to Candice. She said they don't have any openeings and will see the PT tomorrow. Recommended if mom is too concerned to go to urgent care. Mother said will wait until tomorrow to see the doctor in the office. She said it has been like that for 2 weeks, so 1 day will not make any difference. Recommended to her to monitor baby closely toady and if something change to take her to ER.She verbalized understanding.   BF session started per mom in a cross cradle position to the left breast. Most of the areola is visible. Educated mom starting nose to nipple to obtain deep latch and baby was able to achieve it after few attempts. Baby is latching well, has nutritive suckle, and has a good jaw rotation, but comes off every 50-60 seconds with slight cough, then swallows and go back to the breast. It looks like mom has fast let down.After 5 min baby transferred to the other breast for another 5 min and was able to get 2 oz of breast milk. PT reports that she BF baby at home every 2 hours for about 5-7 min on each breast.  At baby's current weight and the frequency of feeding she needs to take around 1.7 oz every 2 hours.So it looks like she is getting enough for the 10 minutes she eats.Mom is exhausted. Since " baby is transferring BM well and fast recommended to mom to try to BF baby every 3 hours ,but to keep her on at least 10 to 15 min on each breast. Laid back and Koala positions recommended and demonstrated in attempt to prevent baby of chocking on the breast and to make it easy to her to swallow. Mom will be back in a week or 2.         Evaluation Completed: 2022 12:36 EST  Patient Name: Michelle Elizondo  :  1985  MRN:  [id number]     REFERRAL  INFORMATION:                          Date of Referral: 22   Person Making Referral: patient  Maternal Reason for Referral: breastfeeding currently  Infant Reason for Referral: regurgitation, other (see comments) (per mom choking frequently)      MATERNAL ASSESSMENT:  Breast Size Issue: none (22)  Breast Shape: Bilateral:, pendulous, round (22)  Breast Density: Bilateral:, full (22)  Areola: Bilateral:, elastic (22)  Nipples: Bilateral:, everted, graspable (22)                MATERNAL INFANT FEEDING:     Maternal Emotional State: relaxed, receptive (22)  Infant Positioning: cross-cradle (22)   Signs of Milk Transfer: audible swallow (22)  Pain with Feeding: no (22)           Milk Ejection Reflex: present (22)           Latch Assistance: minimal assistance (22)                           Feeding Readiness Cues: rooting (22)  Satiety Cues: calm after feeding (22)  Feeding Physical Stress Cues: color unchanged, other (see comments) (coms off frequently to swallow-  coughs 1-2 times and go back to the breast) (22)  Effective Latch During Feeding: yes (22)  Suck/Swallow/Breathing Coordination: present (22)     Prefeeding Weight (gm): 3452 g (121.8 oz) (22)  Postfeeding Weight (gm): 3510 g (123.8 oz) (22)  Weight Gain/Loss (gm) : 58 g (2.1 oz) (22  )      Latch: 1-->repeated attempts, holds nipple in mouth, stimulate to suck (22)  Audible Swallowin-->spontaneous and intermittent (24 hrs old) (22)  Type of Nipple: 2-->everted (after stimulation) (22)  Comfort (Breast/Nipple): 2-->soft/nontender (22)  Hold (Positioning): 1-->minimal assist, teach one side, mother does other, staff holds (22)  Latch Score: 8 (22)      EQUIPMENT TYPE:                                 BREAST PUMPING:          LACTATION REFERRALS:  Lactation Referrals: pediatric care provider (distended abdomen- [facility] office called, talked to Candice. They will see PT tomorrow atr 1115) (22)

## 2022-12-20 ENCOUNTER — HOSPITAL ENCOUNTER (OUTPATIENT)
Dept: LACTATION | Facility: HOSPITAL | Age: 37
Discharge: HOME | End: 2022-12-20

## 2022-12-20 NOTE — LACTATION NOTE
"Baby is 2 months old and mom reports baby has been fussy lately. Mom started birth control 3 weeks ago and reports she \"probably havent been drinking enough the last couple of days.\" She thinks her supply might have decreased. She reports baby is wanting to BF more and and is fussy after BF. Baby BF on both breasts for approx 30 min total and transferred 1.6 oz. Mom reports pumping once a day and getting 3-4 oz. Baby is gaining weight good but this just started over the past week. Encouraged mom to push fluids, eat oatmeal, lactation cookies, BF on demand. Discussed with mom that baby may want to eat more frequently if her milk supply is down.  Today's weight 9-2.1  After BF 9-3.7    Lactation Consult Note    Evaluation Completed: 2022 15:17 EST  Patient Name: Michelle Elizondo  :  1985  MRN:  [id number]     REFERRAL  INFORMATION:                                         DELIVERY HISTORY:  This patient has no babies on file.  This patient has no babies on file.  Skin to skin initiation date/time: This patient has no babies on file. This patient has no babies on file.  Skin to skin end date/time: This patient has no babies on file. This patient has no babies on file.  This patient has no babies on file.    MATERNAL ASSESSMENT:                               INFANT ASSESSMENT:  This patient has no babies on file.  This patient has no babies on file.  This patient has no babies on file.  This patient has no babies on file.  This patient has no babies on file.  This patient has no babies on file.  This patient has no babies on file.  This patient has no babies on file.  This patient has no babies on file.  This patient has no babies on file.  This patient has no babies on file.  This patient has no babies on file.  This patient has no babies on file.  This patient has no babies on file.  This patient has no babies on file.  This patient has no babies on file.  This patient has no babies on file.  This patient " has no babies on file.  This patient has no babies on file.  This patient has no babies on file.      This patient has no babies on file.  This patient has no babies on file.  This patient has no babies on file.  This patient has no babies on file.  This patient has no babies on file.  This patient has no babies on file.    This patient has no babies on file.  This patient has no babies on file.  This patient has no babies on file.        MATERNAL INFANT FEEDING:                                                                       EQUIPMENT TYPE:                                 BREAST PUMPING:          LACTATION REFERRALS:

## 2022-12-29 ENCOUNTER — HOSPITAL ENCOUNTER (OUTPATIENT)
Dept: LACTATION | Facility: HOSPITAL | Age: 37
Discharge: HOME | End: 2022-12-29

## 2022-12-29 NOTE — LACTATION NOTE
Mom reports that her supply has increased some. Baby  BF every 2.5 hours during the day and is going longer between feedings during the night now. Baby BF for 16 min total and transferred 2 oz today.  Today's weight 9-9.4  After BF 9-11.4  Mom, dad and baby [place] but they return here some to see family. Mom knows she can call with questions and make appointments here while in town.    Lactation Consult Note    Evaluation Completed: 2022 13:58 EST  Patient Name: Michelle Elizondo  :  1985  MRN:  [id number]     REFERRAL  INFORMATION:                          Date of Referral: 22      Maternal Reason for Referral: breastfeeding currently         MATERNAL ASSESSMENT:     Breast Shape: round (22)  Breast Density: full (22)  Areola: elastic (22)  Nipples: everted (22)                MATERNAL INFANT FEEDING:     Maternal Emotional State: relaxed (22)  Infant Positioning: cross-cradle (22)   Signs of Milk Transfer: deep jaw excursions noted, audible swallow (22)  Pain with Feeding: no (22)           Milk Ejection Reflex: present (22)  Comfort Measures Following Feeding: air-drying encouraged (22)        Latch Assistance: none needed (22)                           Feeding Readiness Cues: eager, crying, rooting (22)        Effective Latch During Feeding: yes (22)  Suck/Swallow/Breathing Coordination: present (22)     Prefeeding Weight (gm): 4348 g (153.4 oz) (22 1300)  Postfeeding Weight (gm): 4406 g (155.4 oz) (22)  Weight Gain/Loss (gm) : 58 g (2.1 oz) (22)      Latch: 2-->grasps breast, tongue down, lips flanged, rhythmic sucking (22)  Audible Swallowin-->spontaneous and intermittent (24 hrs old) (22)  Type of Nipple: 2-->everted (after stimulation) (22 1300)  Comfort  (Breast/Nipple): 2-->soft/nontender (12/29/22 1300)  Hold (Positioning): 2-->no assist from staff, mother able to position/hold infant (12/29/22 1300)  Latch Score: 10 (12/29/22 1300)      EQUIPMENT TYPE:                                 BREAST PUMPING:          LACTATION REFERRALS: